# Patient Record
Sex: FEMALE | Race: OTHER | HISPANIC OR LATINO | URBAN - METROPOLITAN AREA
[De-identification: names, ages, dates, MRNs, and addresses within clinical notes are randomized per-mention and may not be internally consistent; named-entity substitution may affect disease eponyms.]

---

## 2023-12-14 ENCOUNTER — INPATIENT (INPATIENT)
Facility: HOSPITAL | Age: 28
LOS: 1 days | Discharge: HOME | DRG: 545 | End: 2023-12-16
Attending: OBSTETRICS & GYNECOLOGY | Admitting: OBSTETRICS & GYNECOLOGY
Payer: MEDICAID

## 2023-12-14 VITALS — WEIGHT: 176.37 LBS

## 2023-12-14 DIAGNOSIS — Z78.9 OTHER SPECIFIED HEALTH STATUS: ICD-10-CM

## 2023-12-14 LAB
ABO RH CONFIRMATION: SIGNIFICANT CHANGE UP
ABO RH CONFIRMATION: SIGNIFICANT CHANGE UP
ALBUMIN SERPL ELPH-MCNC: 3 G/DL — LOW (ref 3.3–5)
ALBUMIN SERPL ELPH-MCNC: 3 G/DL — LOW (ref 3.3–5)
ALP SERPL-CCNC: 80 U/L — SIGNIFICANT CHANGE UP (ref 40–120)
ALP SERPL-CCNC: 80 U/L — SIGNIFICANT CHANGE UP (ref 40–120)
ALT FLD-CCNC: 24 U/L — SIGNIFICANT CHANGE UP (ref 12–78)
ALT FLD-CCNC: 24 U/L — SIGNIFICANT CHANGE UP (ref 12–78)
ANION GAP SERPL CALC-SCNC: 5 MMOL/L — SIGNIFICANT CHANGE UP (ref 5–17)
ANION GAP SERPL CALC-SCNC: 5 MMOL/L — SIGNIFICANT CHANGE UP (ref 5–17)
APPEARANCE UR: ABNORMAL
APPEARANCE UR: ABNORMAL
APTT BLD: 23.4 SEC — LOW (ref 24.5–35.6)
APTT BLD: 23.4 SEC — LOW (ref 24.5–35.6)
AST SERPL-CCNC: 11 U/L — LOW (ref 15–37)
AST SERPL-CCNC: 11 U/L — LOW (ref 15–37)
BACTERIA # UR AUTO: NEGATIVE /HPF — SIGNIFICANT CHANGE UP
BACTERIA # UR AUTO: NEGATIVE /HPF — SIGNIFICANT CHANGE UP
BASOPHILS # BLD AUTO: 0.1 K/UL — SIGNIFICANT CHANGE UP (ref 0–0.2)
BASOPHILS # BLD AUTO: 0.1 K/UL — SIGNIFICANT CHANGE UP (ref 0–0.2)
BASOPHILS NFR BLD AUTO: 0.5 % — SIGNIFICANT CHANGE UP (ref 0–2)
BASOPHILS NFR BLD AUTO: 0.5 % — SIGNIFICANT CHANGE UP (ref 0–2)
BILIRUB SERPL-MCNC: 0.6 MG/DL — SIGNIFICANT CHANGE UP (ref 0.2–1.2)
BILIRUB SERPL-MCNC: 0.6 MG/DL — SIGNIFICANT CHANGE UP (ref 0.2–1.2)
BILIRUB UR-MCNC: ABNORMAL
BILIRUB UR-MCNC: ABNORMAL
BLD GP AB SCN SERPL QL: SIGNIFICANT CHANGE UP
BLD GP AB SCN SERPL QL: SIGNIFICANT CHANGE UP
BUN SERPL-MCNC: 5 MG/DL — LOW (ref 7–23)
BUN SERPL-MCNC: 5 MG/DL — LOW (ref 7–23)
CALCIUM SERPL-MCNC: 8.6 MG/DL — SIGNIFICANT CHANGE UP (ref 8.5–10.1)
CALCIUM SERPL-MCNC: 8.6 MG/DL — SIGNIFICANT CHANGE UP (ref 8.5–10.1)
CAST: 6 /LPF — HIGH (ref 0–4)
CAST: 6 /LPF — HIGH (ref 0–4)
CHLORIDE SERPL-SCNC: 108 MMOL/L — SIGNIFICANT CHANGE UP (ref 96–108)
CHLORIDE SERPL-SCNC: 108 MMOL/L — SIGNIFICANT CHANGE UP (ref 96–108)
CO2 SERPL-SCNC: 25 MMOL/L — SIGNIFICANT CHANGE UP (ref 22–31)
CO2 SERPL-SCNC: 25 MMOL/L — SIGNIFICANT CHANGE UP (ref 22–31)
COD CRY URNS QL: PRESENT
COD CRY URNS QL: PRESENT
COLOR SPEC: SIGNIFICANT CHANGE UP
COLOR SPEC: SIGNIFICANT CHANGE UP
CREAT SERPL-MCNC: 0.56 MG/DL — SIGNIFICANT CHANGE UP (ref 0.5–1.3)
CREAT SERPL-MCNC: 0.56 MG/DL — SIGNIFICANT CHANGE UP (ref 0.5–1.3)
DIFF PNL FLD: NEGATIVE — SIGNIFICANT CHANGE UP
DIFF PNL FLD: NEGATIVE — SIGNIFICANT CHANGE UP
EGFR: 129 ML/MIN/1.73M2 — SIGNIFICANT CHANGE UP
EGFR: 129 ML/MIN/1.73M2 — SIGNIFICANT CHANGE UP
EOSINOPHIL # BLD AUTO: 0.03 K/UL — SIGNIFICANT CHANGE UP (ref 0–0.5)
EOSINOPHIL # BLD AUTO: 0.03 K/UL — SIGNIFICANT CHANGE UP (ref 0–0.5)
EOSINOPHIL NFR BLD AUTO: 0.1 % — SIGNIFICANT CHANGE UP (ref 0–6)
EOSINOPHIL NFR BLD AUTO: 0.1 % — SIGNIFICANT CHANGE UP (ref 0–6)
GLUCOSE SERPL-MCNC: 139 MG/DL — HIGH (ref 70–99)
GLUCOSE SERPL-MCNC: 139 MG/DL — HIGH (ref 70–99)
GLUCOSE UR QL: NEGATIVE MG/DL — SIGNIFICANT CHANGE UP
GLUCOSE UR QL: NEGATIVE MG/DL — SIGNIFICANT CHANGE UP
GRAN CASTS # UR COMP ASSIST: SIGNIFICANT CHANGE UP
GRAN CASTS # UR COMP ASSIST: SIGNIFICANT CHANGE UP
HCG SERPL-ACNC: HIGH MIU/ML
HCG SERPL-ACNC: HIGH MIU/ML
HCT VFR BLD CALC: 28.5 % — LOW (ref 34.5–45)
HCT VFR BLD CALC: 28.5 % — LOW (ref 34.5–45)
HGB BLD-MCNC: 9.7 G/DL — LOW (ref 11.5–15.5)
HGB BLD-MCNC: 9.7 G/DL — LOW (ref 11.5–15.5)
HYALINE CASTS # UR AUTO: SIGNIFICANT CHANGE UP
HYALINE CASTS # UR AUTO: SIGNIFICANT CHANGE UP
IMM GRANULOCYTES NFR BLD AUTO: 0.6 % — SIGNIFICANT CHANGE UP (ref 0–0.9)
IMM GRANULOCYTES NFR BLD AUTO: 0.6 % — SIGNIFICANT CHANGE UP (ref 0–0.9)
INR BLD: 1.09 RATIO — SIGNIFICANT CHANGE UP (ref 0.85–1.18)
INR BLD: 1.09 RATIO — SIGNIFICANT CHANGE UP (ref 0.85–1.18)
KETONES UR-MCNC: ABNORMAL MG/DL
KETONES UR-MCNC: ABNORMAL MG/DL
LEUKOCYTE ESTERASE UR-ACNC: ABNORMAL
LEUKOCYTE ESTERASE UR-ACNC: ABNORMAL
LYMPHOCYTES # BLD AUTO: 11.7 % — LOW (ref 13–44)
LYMPHOCYTES # BLD AUTO: 11.7 % — LOW (ref 13–44)
LYMPHOCYTES # BLD AUTO: 2.56 K/UL — SIGNIFICANT CHANGE UP (ref 1–3.3)
LYMPHOCYTES # BLD AUTO: 2.56 K/UL — SIGNIFICANT CHANGE UP (ref 1–3.3)
MCHC RBC-ENTMCNC: 30.4 PG — SIGNIFICANT CHANGE UP (ref 27–34)
MCHC RBC-ENTMCNC: 30.4 PG — SIGNIFICANT CHANGE UP (ref 27–34)
MCHC RBC-ENTMCNC: 34 GM/DL — SIGNIFICANT CHANGE UP (ref 32–36)
MCHC RBC-ENTMCNC: 34 GM/DL — SIGNIFICANT CHANGE UP (ref 32–36)
MCV RBC AUTO: 89.3 FL — SIGNIFICANT CHANGE UP (ref 80–100)
MCV RBC AUTO: 89.3 FL — SIGNIFICANT CHANGE UP (ref 80–100)
MONOCYTES # BLD AUTO: 1.32 K/UL — HIGH (ref 0–0.9)
MONOCYTES # BLD AUTO: 1.32 K/UL — HIGH (ref 0–0.9)
MONOCYTES NFR BLD AUTO: 6 % — SIGNIFICANT CHANGE UP (ref 2–14)
MONOCYTES NFR BLD AUTO: 6 % — SIGNIFICANT CHANGE UP (ref 2–14)
NEUTROPHILS # BLD AUTO: 17.72 K/UL — HIGH (ref 1.8–7.4)
NEUTROPHILS # BLD AUTO: 17.72 K/UL — HIGH (ref 1.8–7.4)
NEUTROPHILS NFR BLD AUTO: 81.1 % — HIGH (ref 43–77)
NEUTROPHILS NFR BLD AUTO: 81.1 % — HIGH (ref 43–77)
NITRITE UR-MCNC: NEGATIVE — SIGNIFICANT CHANGE UP
NITRITE UR-MCNC: NEGATIVE — SIGNIFICANT CHANGE UP
PH UR: 6 — SIGNIFICANT CHANGE UP (ref 5–8)
PH UR: 6 — SIGNIFICANT CHANGE UP (ref 5–8)
PLATELET # BLD AUTO: 475 K/UL — HIGH (ref 150–400)
PLATELET # BLD AUTO: 475 K/UL — HIGH (ref 150–400)
POTASSIUM SERPL-MCNC: 4.2 MMOL/L — SIGNIFICANT CHANGE UP (ref 3.5–5.3)
POTASSIUM SERPL-MCNC: 4.2 MMOL/L — SIGNIFICANT CHANGE UP (ref 3.5–5.3)
POTASSIUM SERPL-SCNC: 4.2 MMOL/L — SIGNIFICANT CHANGE UP (ref 3.5–5.3)
POTASSIUM SERPL-SCNC: 4.2 MMOL/L — SIGNIFICANT CHANGE UP (ref 3.5–5.3)
PROT SERPL-MCNC: 7.1 GM/DL — SIGNIFICANT CHANGE UP (ref 6–8.3)
PROT SERPL-MCNC: 7.1 GM/DL — SIGNIFICANT CHANGE UP (ref 6–8.3)
PROT UR-MCNC: 100 MG/DL
PROT UR-MCNC: 100 MG/DL
PROTHROM AB SERPL-ACNC: 12.3 SEC — SIGNIFICANT CHANGE UP (ref 9.5–13)
PROTHROM AB SERPL-ACNC: 12.3 SEC — SIGNIFICANT CHANGE UP (ref 9.5–13)
RBC # BLD: 3.19 M/UL — LOW (ref 3.8–5.2)
RBC # BLD: 3.19 M/UL — LOW (ref 3.8–5.2)
RBC # FLD: 12.7 % — SIGNIFICANT CHANGE UP (ref 10.3–14.5)
RBC # FLD: 12.7 % — SIGNIFICANT CHANGE UP (ref 10.3–14.5)
RBC CASTS # UR COMP ASSIST: 6 /HPF — HIGH (ref 0–4)
RBC CASTS # UR COMP ASSIST: 6 /HPF — HIGH (ref 0–4)
SODIUM SERPL-SCNC: 138 MMOL/L — SIGNIFICANT CHANGE UP (ref 135–145)
SODIUM SERPL-SCNC: 138 MMOL/L — SIGNIFICANT CHANGE UP (ref 135–145)
SP GR SPEC: 1.02 — SIGNIFICANT CHANGE UP (ref 1–1.03)
SP GR SPEC: 1.02 — SIGNIFICANT CHANGE UP (ref 1–1.03)
SQUAMOUS # UR AUTO: 6 /HPF — HIGH (ref 0–5)
SQUAMOUS # UR AUTO: 6 /HPF — HIGH (ref 0–5)
UROBILINOGEN FLD QL: 1 MG/DL — SIGNIFICANT CHANGE UP (ref 0.2–1)
UROBILINOGEN FLD QL: 1 MG/DL — SIGNIFICANT CHANGE UP (ref 0.2–1)
WBC # BLD: 21.87 K/UL — HIGH (ref 3.8–10.5)
WBC # BLD: 21.87 K/UL — HIGH (ref 3.8–10.5)
WBC # FLD AUTO: 21.87 K/UL — HIGH (ref 3.8–10.5)
WBC # FLD AUTO: 21.87 K/UL — HIGH (ref 3.8–10.5)
WBC UR QL: 4 /HPF — SIGNIFICANT CHANGE UP (ref 0–5)
WBC UR QL: 4 /HPF — SIGNIFICANT CHANGE UP (ref 0–5)

## 2023-12-14 PROCEDURE — 76817 TRANSVAGINAL US OBSTETRIC: CPT | Mod: 26

## 2023-12-14 PROCEDURE — 85384 FIBRINOGEN ACTIVITY: CPT

## 2023-12-14 PROCEDURE — 36415 COLL VENOUS BLD VENIPUNCTURE: CPT

## 2023-12-14 PROCEDURE — P9016: CPT

## 2023-12-14 PROCEDURE — 80053 COMPREHEN METABOLIC PANEL: CPT

## 2023-12-14 PROCEDURE — 36430 TRANSFUSION BLD/BLD COMPNT: CPT

## 2023-12-14 PROCEDURE — C9399: CPT

## 2023-12-14 PROCEDURE — P9040: CPT

## 2023-12-14 PROCEDURE — 93005 ELECTROCARDIOGRAM TRACING: CPT

## 2023-12-14 PROCEDURE — 85027 COMPLETE CBC AUTOMATED: CPT

## 2023-12-14 PROCEDURE — 87040 BLOOD CULTURE FOR BACTERIA: CPT

## 2023-12-14 PROCEDURE — 83605 ASSAY OF LACTIC ACID: CPT

## 2023-12-14 PROCEDURE — 86920 COMPATIBILITY TEST SPIN: CPT

## 2023-12-14 PROCEDURE — 88305 TISSUE EXAM BY PATHOLOGIST: CPT | Mod: 26

## 2023-12-14 PROCEDURE — 85730 THROMBOPLASTIN TIME PARTIAL: CPT

## 2023-12-14 PROCEDURE — 86901 BLOOD TYPING SEROLOGIC RH(D): CPT

## 2023-12-14 PROCEDURE — 99291 CRITICAL CARE FIRST HOUR: CPT

## 2023-12-14 PROCEDURE — 85025 COMPLETE CBC W/AUTO DIFF WBC: CPT

## 2023-12-14 PROCEDURE — 86850 RBC ANTIBODY SCREEN: CPT

## 2023-12-14 PROCEDURE — 85610 PROTHROMBIN TIME: CPT

## 2023-12-14 PROCEDURE — 88305 TISSUE EXAM BY PATHOLOGIST: CPT

## 2023-12-14 PROCEDURE — 86900 BLOOD TYPING SEROLOGIC ABO: CPT

## 2023-12-14 RX ORDER — ACETAMINOPHEN 500 MG
1000 TABLET ORAL ONCE
Refills: 0 | Status: COMPLETED | OUTPATIENT
Start: 2023-12-14 | End: 2023-12-14

## 2023-12-14 RX ORDER — SODIUM CHLORIDE 9 MG/ML
2000 INJECTION INTRAMUSCULAR; INTRAVENOUS; SUBCUTANEOUS ONCE
Refills: 0 | Status: COMPLETED | OUTPATIENT
Start: 2023-12-14 | End: 2023-12-14

## 2023-12-14 RX ADMIN — Medication 400 MILLIGRAM(S): at 22:08

## 2023-12-14 RX ADMIN — SODIUM CHLORIDE 2000 MILLILITER(S): 9 INJECTION INTRAMUSCULAR; INTRAVENOUS; SUBCUTANEOUS at 21:02

## 2023-12-14 NOTE — H&P ADULT - ATTENDING COMMENTS
27 yo P0 with right adnexal ectopic, 10 weeks, + FHR, high risk for rupture, h/o syncope, patient with pale appearance, mild tachycardia, hypotensive, moderate anemia, will repeat  H/H now, 2 units on hold, will transfuse as needed, findings explained to patient, patient booked for diagnostic laparoscopy, possible salpingoophorectomy, possible laparotomy, R/B/A of procedure explained to patient in great detail, patient verbalized understanding and agreed to procedure. 29 yo P0 with right adnexal ectopic, 10 weeks, + FHR, high risk for rupture, h/o syncope, patient with pale appearance, mild tachycardia, hypotensive, moderate anemia, will repeat  H/H now, 2 units on hold, will transfuse as needed, findings explained to patient, patient booked for diagnostic laparoscopy, possible salpingoophorectomy, possible laparotomy, R/B/A of procedure explained to patient in great detail, patient verbalized understanding and agreed to procedure. 29 yo P0 with right adnexal ectopic, 10 weeks, + FHR, high risk for rupture, h/o syncope, patient with pale appearance, mild tachycardia, hypotensive, moderate anemia, vitals worsening, patient to get 2 units of PRBC transfused,  findings explained to patient, patient booked for diagnostic laparoscopy, possible salpingoophorectomy, possible laparotomy, R/B/A of procedure explained to patient in great detail, patient verbalized understanding and agreed to procedure.

## 2023-12-14 NOTE — ED ADULT NURSE NOTE - NSFALLRISKINTERV_ED_ALL_ED
Communicate fall risk and risk factors to all staff, patient, and family/Provide visual cue: yellow wristband, yellow gown, etc/Reinforce activity limits and safety measures with patient and family/Call bell, personal items and telephone in reach/Instruct patient to call for assistance before getting out of bed/chair/stretcher/Non-slip footwear applied when patient is off stretcher/[place] to call system/Physically safe environment - no spills, clutter or unnecessary equipment/Purposeful Proactive Rounding/Room/bathroom lighting operational, light cord in reach Communicate fall risk and risk factors to all staff, patient, and family/Provide visual cue: yellow wristband, yellow gown, etc/Reinforce activity limits and safety measures with patient and family/Call bell, personal items and telephone in reach/Instruct patient to call for assistance before getting out of bed/chair/stretcher/Non-slip footwear applied when patient is off stretcher/[place] to call system/Physically safe environment - no spills, clutter or unnecessary equipment/Purposeful Proactive Rounding/Room/bathroom lighting operational, light cord in reach

## 2023-12-14 NOTE — CONSULT NOTE ADULT - ASSESSMENT
28y , LMP 10/8/2023, presents with generalized abdominal pain for two days. Also reports heavy vaginal bleeding last week.    A/P:  -On presentation patient hypotensive to 70s/40 and tachycardic to 110s. BP and HR now improved after IVF. Afebrile.  -FAST exam negative. Bedside sono shows IUP with +HR.  -TVUS ordered for further evaluation.  -CMT noted with white vaginal discharge. Possible PID vs intrauterine infection vs septic ab.  -Pending GCCT and Bacterial vaginosis cultures.  -CBC shows Hgb 9 and elevated WBC of 21.  -Pending blood cultures.   -Pending UA.  -Will consider ABX.  -Continue with IVF hydration.

## 2023-12-14 NOTE — ED ADULT NURSE NOTE - CHIEF COMPLAINT QUOTE
brought in by wheelchair, complains of severe abdominal pain x 2 hours. hypotensive. patient had miscarriage 1 week ago. reportts vaginal bleeding two days ago. patient pale and lightheaded.

## 2023-12-14 NOTE — ED ADULT TRIAGE NOTE - WEIGHT METHOD
stated Additional Notes: Doxycycline 20 mg BID In reserve Detail Level: Simple Render Risk Assessment In Note?: no

## 2023-12-14 NOTE — ED PROVIDER NOTE - CHIEF COMPLAINT
Patient tolerated the procedure very well under propofol sedation.
The patient is a 28y Female complaining of abdominal pain.

## 2023-12-14 NOTE — ED PROVIDER NOTE - PROGRESS NOTE DETAILS
Cuong Parsons: Pt immediately evaluated. US contacted will come to bedside r/o ectopic. Spoke to OB immediately recommends 2 units of PRBCs.

## 2023-12-14 NOTE — H&P ADULT - NSHPLABSRESULTS_GEN_ALL_CORE
LABS:                        9.7    21.87 )-----------( 475      ( 14 Dec 2023 20:40 )             28.5

## 2023-12-14 NOTE — H&P ADULT - NSHPPHYSICALEXAM_GEN_ALL_CORE
Vital Signs Last 24 Hrs  T(C): 36.8 (14 Dec 2023 20:25), Max: 36.8 (14 Dec 2023 20:25)  T(F): 98.2 (14 Dec 2023 20:25), Max: 98.2 (14 Dec 2023 20:25)  HR: 114 (14 Dec 2023 20:25) (114 - 114)  BP: 76/47 (14 Dec 2023 20:25) (76/47 - 76/47)  BP(mean): 56 (14 Dec 2023 20:25) (56 - 56)  RR: 20 (14 Dec 2023 20:25) (20 - 20)  SpO2: 100% (14 Dec 2023 20:25) (100% - 100%)    Parameters below as of 14 Dec 2023 20:25  Patient On (Oxygen Delivery Method): room air         PHYSICAL EXAM:  General: Pale appearing  ABDOMEN: Tender to palpation of all quadrants, greater in bilateral lower quadrants and suprapubic area. soft, Nondistended  PELVIC:        EXTERNAL GENITALIA: Normal. No rashes or lesions noted.         VAGINA: minimal white vaginal discharge no blood noted.          CERVIX: + CMT, no lesions. closed, no active bleeding through os        Unable to perform bimanual exam due to patient discomfort

## 2023-12-14 NOTE — ED ADULT NURSE NOTE - OBJECTIVE STATEMENT
Pt is a 27 y/o female who present to the ED with c/o abdominal pain x 3 days.Pt states the pain got worst 2 hours prior to arrival . hypotensive. patient had miscarriage 1 week ago. reports vaginal bleeding two days ago. patient pale and lightheaded.

## 2023-12-14 NOTE — H&P ADULT - ASSESSMENT
What Type Of Note Output Would You Prefer (Optional)?: Bullet Format How Severe Is Your Skin Lesion?: mild Has Your Skin Lesion Been Treated?: not been treated Is This A New Presentation, Or A Follow-Up?: Skin Lesion Additional History: Pt had an abscess on side of finger nail drained at the ER on 9/30/2023. Pt reports that lesion is still painful. She was not given any rx for the abscess. She believes the infection was from getting her nails done. 28y , LMP 10/8/2023, presents with generalized abdominal pain for two days. Also reports heavy vaginal bleeding last week.    A/P:  -Patient is now tachycardic and pale appearing. Stat CBC ordered. 2U RBC on hold.  -Radiologist called to report right adnexal ectopic pregnancy.  -Due to clinical presentation and vital signs discussed iw patient recommendation for urgent surgical management.  -Patient agrees with surgical management for ectopic pregnancy. Patient consented. Will start preparing for OR.    Discussed with Dr. Sanon, 28y , LMP 10/8/2023, presents with generalized abdominal pain for two days. Also reports heavy vaginal bleeding last week.    A/P:  -Patient is now tachycardic and pale appearing. Stat CBC ordered. 2U RBC on hold.  -Radiologist called to report right adnexal ectopic pregnancy, 10 weeks, + FHR.  -Due to clinical presentation and vital signs discussed with patient recommendation for urgent surgical management.  -Patient agrees with surgical management for ectopic pregnancy. Patient consented. Will start preparing for OR.    Discussed with Dr. Sanon,

## 2023-12-14 NOTE — H&P ADULT - HISTORY OF PRESENT ILLNESS
28y , LMP 10/8/2023, presents with generalized abdominal pain for two days. Patient reports she believes she is pregnant but she thought she lost the pregnancy due to vaginal bleeding. LMP was 10/7/2023, but the patient reports heavy vaginal bleeding last week lasting three days. She has not established prenatal care or taken a pregnancy test at home. Patient also reports chills and weakness. Patient reports pink/white vaginal discharge for several days. Denies fevers, SOB, CP.    PMHX; denies  PSHX; denies  POBHX; denies hx of prior pregnancies  PGYNHX: denies hx of STIs, unsure if she has a history of ovarian cysts or fibroids  Allergies: No Known Allergies  MEDS: Denies

## 2023-12-14 NOTE — CONSULT NOTE ADULT - ATTENDING COMMENTS
29 yo P0 s/p syncope, presents with abdominal pain and h/o vaginal bleeding x 3 days, ultrasound done at bedside by ED attending showing aprox 10 weeks pregnancy with + FHR, no FF, urgent transvaginal US pending. Patient hypotensive, with mild tachycardia, moderate anemia noted, 2 units on hold.     addendum:   official TVUS showing right ectopic adnexal pregnancy, no FF, patient with pale appearance, mild tachycardic and hypotensive high risk for ruptured ectopic, to be booked urgently to the OR for diagnostic laparoscopy, possible salpingoophorectomy, possible laparotomy.

## 2023-12-14 NOTE — H&P ADULT - NSHPSOURCEINFORD_GEN_ALL_CORE
Patient transported to echo lab on bed with cardiac mopnitor, accompanied by 1 RN and patient escort staff.   Patient

## 2023-12-14 NOTE — ED PROVIDER NOTE - OBJECTIVE STATEMENT
29 y/o  female with no pertinent PMHx BIB wheelchair with boyfriend c/o severe abdominal pain. Pt reports last menstrual period was about 2 months ago, 10/06. Since last week Thursday pt with constant bilateral abdominal pain and since last week Friday pt with heavy vaginal bleeding, estimates changing her pad every hour. Today after showering, pt syncopized and boyfriend caught her. Since 2 hours PTA pt with acute worsening of abdominal pain so boyfriend brought pt to the ED. At the ED, pt very pale-appearing and hypotensive.    ID#: [id number] 29 y/o  female with no pertinent PMHx BIB wheelchair with boyfriend c/o severe abdominal pain. Pt reports last menstrual period was about 2 months ago, 10/06. Since last week Thursday pt with constant bilateral abdominal pain and since last week Friday pt with heavy vaginal bleeding, estimates changing her pad every hour. Today after showering, pt syncopized and boyfriend caught her. Since 2 hours PTA pt with acute worsening of abdominal pain so boyfriend brought pt to the ED. At the ED, pt very pale-appearing and hypotensive.    ID#: [id number]

## 2023-12-14 NOTE — CONSULT NOTE ADULT - SUBJECTIVE AND OBJECTIVE BOX
HPI:     28y , LMP 10/8/2023, presents with generalized abdominal pain for two days. Patient reports she believes she is pregnant but she thought she lost the pregnancy due to vaginal bleeding. LMP was 10/7/2023, but the patient reports heavy vaginal bleeding last week lasting three days. She has not established prenatal care or taken a pregnancy test at home. Patient also reports chills and weakness. Patient reports pink/white vaginal discharge for several days. Denies fevers, SOB, CP.    PMHX; denies  PSHX; denies  POBHX; denies hx of prior pregnancies  PGYNHX: denies hx of STIs, unsure if she has a history of ovarian cysts or fibroids  Allergies: No Known Allergies  MEDS: Denies      Vital Signs Last 24 Hrs  T(C): 36.8 (14 Dec 2023 20:25), Max: 36.8 (14 Dec 2023 20:25)  T(F): 98.2 (14 Dec 2023 20:25), Max: 98.2 (14 Dec 2023 20:25)  HR: 114 (14 Dec 2023 20:25) (114 - 114)  BP: 76/47 (14 Dec 2023 20:25) (76/47 - 76/47)  BP(mean): 56 (14 Dec 2023 20:25) (56 - 56)  RR: 20 (14 Dec 2023 20:25) (20 - 20)  SpO2: 100% (14 Dec 2023 20:25) (100% - 100%)    Parameters below as of 14 Dec 2023 20:25  Patient On (Oxygen Delivery Method): room air         PHYSICAL EXAM:  General: Pale appearing  ABDOMEN: Tender to palpation of all quadrants, greater in bilateral lower quadrants and suprapubic area. soft, Nondistended  PELVIC:        EXTERNAL GENITALIA: Normal. No rashes or lesions noted.         VAGINA: minimal white vaginal discharge no blood noted.          CERVIX: + CMT, no lesions. closed, no active bleeding through os        Unable to perform bimanual exam due to patient discomfort    LABS:                        9.7    21.87 )-----------( 475      ( 14 Dec 2023 20:40 )             28.5                 RADIOLOGY STUDIES:

## 2023-12-15 LAB
ALBUMIN SERPL ELPH-MCNC: 2.1 G/DL — LOW (ref 3.3–5)
ALP SERPL-CCNC: 54 U/L — SIGNIFICANT CHANGE UP (ref 40–120)
ALP SERPL-CCNC: 54 U/L — SIGNIFICANT CHANGE UP (ref 40–120)
ALP SERPL-CCNC: 59 U/L — SIGNIFICANT CHANGE UP (ref 40–120)
ALP SERPL-CCNC: 59 U/L — SIGNIFICANT CHANGE UP (ref 40–120)
ALT FLD-CCNC: 15 U/L — SIGNIFICANT CHANGE UP (ref 12–78)
ANION GAP SERPL CALC-SCNC: 7 MMOL/L — SIGNIFICANT CHANGE UP (ref 5–17)
ANION GAP SERPL CALC-SCNC: 7 MMOL/L — SIGNIFICANT CHANGE UP (ref 5–17)
ANION GAP SERPL CALC-SCNC: 8 MMOL/L — SIGNIFICANT CHANGE UP (ref 5–17)
ANION GAP SERPL CALC-SCNC: 8 MMOL/L — SIGNIFICANT CHANGE UP (ref 5–17)
APTT BLD: 27.7 SEC — SIGNIFICANT CHANGE UP (ref 24.5–35.6)
APTT BLD: 27.7 SEC — SIGNIFICANT CHANGE UP (ref 24.5–35.6)
AST SERPL-CCNC: 12 U/L — LOW (ref 15–37)
AST SERPL-CCNC: 12 U/L — LOW (ref 15–37)
AST SERPL-CCNC: 13 U/L — LOW (ref 15–37)
AST SERPL-CCNC: 13 U/L — LOW (ref 15–37)
BASOPHILS # BLD AUTO: 0 K/UL — SIGNIFICANT CHANGE UP (ref 0–0.2)
BASOPHILS # BLD AUTO: 0 K/UL — SIGNIFICANT CHANGE UP (ref 0–0.2)
BASOPHILS # BLD AUTO: 0.02 K/UL — SIGNIFICANT CHANGE UP (ref 0–0.2)
BASOPHILS # BLD AUTO: 0.02 K/UL — SIGNIFICANT CHANGE UP (ref 0–0.2)
BASOPHILS NFR BLD AUTO: 0 % — SIGNIFICANT CHANGE UP (ref 0–2)
BASOPHILS NFR BLD AUTO: 0 % — SIGNIFICANT CHANGE UP (ref 0–2)
BASOPHILS NFR BLD AUTO: 0.1 % — SIGNIFICANT CHANGE UP (ref 0–2)
BASOPHILS NFR BLD AUTO: 0.1 % — SIGNIFICANT CHANGE UP (ref 0–2)
BILIRUB SERPL-MCNC: 0.4 MG/DL — SIGNIFICANT CHANGE UP (ref 0.2–1.2)
BILIRUB SERPL-MCNC: 0.4 MG/DL — SIGNIFICANT CHANGE UP (ref 0.2–1.2)
BILIRUB SERPL-MCNC: 0.9 MG/DL — SIGNIFICANT CHANGE UP (ref 0.2–1.2)
BILIRUB SERPL-MCNC: 0.9 MG/DL — SIGNIFICANT CHANGE UP (ref 0.2–1.2)
BUN SERPL-MCNC: 4 MG/DL — LOW (ref 7–23)
CALCIUM SERPL-MCNC: 6.8 MG/DL — LOW (ref 8.5–10.1)
CALCIUM SERPL-MCNC: 6.8 MG/DL — LOW (ref 8.5–10.1)
CALCIUM SERPL-MCNC: 7.3 MG/DL — LOW (ref 8.5–10.1)
CALCIUM SERPL-MCNC: 7.3 MG/DL — LOW (ref 8.5–10.1)
CHLORIDE SERPL-SCNC: 111 MMOL/L — HIGH (ref 96–108)
CHLORIDE SERPL-SCNC: 111 MMOL/L — HIGH (ref 96–108)
CHLORIDE SERPL-SCNC: 112 MMOL/L — HIGH (ref 96–108)
CHLORIDE SERPL-SCNC: 112 MMOL/L — HIGH (ref 96–108)
CO2 SERPL-SCNC: 18 MMOL/L — LOW (ref 22–31)
CO2 SERPL-SCNC: 18 MMOL/L — LOW (ref 22–31)
CO2 SERPL-SCNC: 21 MMOL/L — LOW (ref 22–31)
CO2 SERPL-SCNC: 21 MMOL/L — LOW (ref 22–31)
CREAT SERPL-MCNC: 0.37 MG/DL — LOW (ref 0.5–1.3)
CREAT SERPL-MCNC: 0.37 MG/DL — LOW (ref 0.5–1.3)
CREAT SERPL-MCNC: 0.43 MG/DL — LOW (ref 0.5–1.3)
CREAT SERPL-MCNC: 0.43 MG/DL — LOW (ref 0.5–1.3)
EGFR: 137 ML/MIN/1.73M2 — SIGNIFICANT CHANGE UP
EGFR: 137 ML/MIN/1.73M2 — SIGNIFICANT CHANGE UP
EGFR: 143 ML/MIN/1.73M2 — SIGNIFICANT CHANGE UP
EGFR: 143 ML/MIN/1.73M2 — SIGNIFICANT CHANGE UP
EOSINOPHIL # BLD AUTO: 0 K/UL — SIGNIFICANT CHANGE UP (ref 0–0.5)
EOSINOPHIL NFR BLD AUTO: 0 % — SIGNIFICANT CHANGE UP (ref 0–6)
FIBRINOGEN PPP-MCNC: 345 MG/DL — SIGNIFICANT CHANGE UP (ref 200–435)
FIBRINOGEN PPP-MCNC: 345 MG/DL — SIGNIFICANT CHANGE UP (ref 200–435)
GLUCOSE SERPL-MCNC: 121 MG/DL — HIGH (ref 70–99)
GLUCOSE SERPL-MCNC: 121 MG/DL — HIGH (ref 70–99)
GLUCOSE SERPL-MCNC: 171 MG/DL — HIGH (ref 70–99)
GLUCOSE SERPL-MCNC: 171 MG/DL — HIGH (ref 70–99)
HCT VFR BLD CALC: 20.5 % — CRITICAL LOW (ref 34.5–45)
HCT VFR BLD CALC: 20.5 % — CRITICAL LOW (ref 34.5–45)
HCT VFR BLD CALC: 21.4 % — LOW (ref 34.5–45)
HCT VFR BLD CALC: 21.4 % — LOW (ref 34.5–45)
HCT VFR BLD CALC: 26.8 % — LOW (ref 34.5–45)
HCT VFR BLD CALC: 26.8 % — LOW (ref 34.5–45)
HGB BLD-MCNC: 7.1 G/DL — LOW (ref 11.5–15.5)
HGB BLD-MCNC: 7.1 G/DL — LOW (ref 11.5–15.5)
HGB BLD-MCNC: 7.2 G/DL — LOW (ref 11.5–15.5)
HGB BLD-MCNC: 7.2 G/DL — LOW (ref 11.5–15.5)
HGB BLD-MCNC: 8.6 G/DL — LOW (ref 11.5–15.5)
HGB BLD-MCNC: 8.6 G/DL — LOW (ref 11.5–15.5)
IMM GRANULOCYTES NFR BLD AUTO: 0.4 % — SIGNIFICANT CHANGE UP (ref 0–0.9)
IMM GRANULOCYTES NFR BLD AUTO: 0.4 % — SIGNIFICANT CHANGE UP (ref 0–0.9)
INR BLD: 1.14 RATIO — SIGNIFICANT CHANGE UP (ref 0.85–1.18)
INR BLD: 1.14 RATIO — SIGNIFICANT CHANGE UP (ref 0.85–1.18)
LACTATE SERPL-SCNC: 1.7 MMOL/L — SIGNIFICANT CHANGE UP (ref 0.7–2)
LACTATE SERPL-SCNC: 1.7 MMOL/L — SIGNIFICANT CHANGE UP (ref 0.7–2)
LYMPHOCYTES # BLD AUTO: 0.69 K/UL — LOW (ref 1–3.3)
LYMPHOCYTES # BLD AUTO: 0.69 K/UL — LOW (ref 1–3.3)
LYMPHOCYTES # BLD AUTO: 1.29 K/UL — SIGNIFICANT CHANGE UP (ref 1–3.3)
LYMPHOCYTES # BLD AUTO: 1.29 K/UL — SIGNIFICANT CHANGE UP (ref 1–3.3)
LYMPHOCYTES # BLD AUTO: 3 % — LOW (ref 13–44)
LYMPHOCYTES # BLD AUTO: 3 % — LOW (ref 13–44)
LYMPHOCYTES # BLD AUTO: 8.1 % — LOW (ref 13–44)
LYMPHOCYTES # BLD AUTO: 8.1 % — LOW (ref 13–44)
MCHC RBC-ENTMCNC: 29.5 PG — SIGNIFICANT CHANGE UP (ref 27–34)
MCHC RBC-ENTMCNC: 29.5 PG — SIGNIFICANT CHANGE UP (ref 27–34)
MCHC RBC-ENTMCNC: 29.6 PG — SIGNIFICANT CHANGE UP (ref 27–34)
MCHC RBC-ENTMCNC: 29.6 PG — SIGNIFICANT CHANGE UP (ref 27–34)
MCHC RBC-ENTMCNC: 30.1 PG — SIGNIFICANT CHANGE UP (ref 27–34)
MCHC RBC-ENTMCNC: 30.1 PG — SIGNIFICANT CHANGE UP (ref 27–34)
MCHC RBC-ENTMCNC: 32.1 GM/DL — SIGNIFICANT CHANGE UP (ref 32–36)
MCHC RBC-ENTMCNC: 32.1 GM/DL — SIGNIFICANT CHANGE UP (ref 32–36)
MCHC RBC-ENTMCNC: 33.6 GM/DL — SIGNIFICANT CHANGE UP (ref 32–36)
MCHC RBC-ENTMCNC: 33.6 GM/DL — SIGNIFICANT CHANGE UP (ref 32–36)
MCHC RBC-ENTMCNC: 34.6 GM/DL — SIGNIFICANT CHANGE UP (ref 32–36)
MCHC RBC-ENTMCNC: 34.6 GM/DL — SIGNIFICANT CHANGE UP (ref 32–36)
MCV RBC AUTO: 86.9 FL — SIGNIFICANT CHANGE UP (ref 80–100)
MCV RBC AUTO: 86.9 FL — SIGNIFICANT CHANGE UP (ref 80–100)
MCV RBC AUTO: 88.1 FL — SIGNIFICANT CHANGE UP (ref 80–100)
MCV RBC AUTO: 88.1 FL — SIGNIFICANT CHANGE UP (ref 80–100)
MCV RBC AUTO: 91.8 FL — SIGNIFICANT CHANGE UP (ref 80–100)
MCV RBC AUTO: 91.8 FL — SIGNIFICANT CHANGE UP (ref 80–100)
MONOCYTES # BLD AUTO: 0.23 K/UL — SIGNIFICANT CHANGE UP (ref 0–0.9)
MONOCYTES # BLD AUTO: 0.23 K/UL — SIGNIFICANT CHANGE UP (ref 0–0.9)
MONOCYTES # BLD AUTO: 0.56 K/UL — SIGNIFICANT CHANGE UP (ref 0–0.9)
MONOCYTES # BLD AUTO: 0.56 K/UL — SIGNIFICANT CHANGE UP (ref 0–0.9)
MONOCYTES NFR BLD AUTO: 1 % — LOW (ref 2–14)
MONOCYTES NFR BLD AUTO: 1 % — LOW (ref 2–14)
MONOCYTES NFR BLD AUTO: 3.5 % — SIGNIFICANT CHANGE UP (ref 2–14)
MONOCYTES NFR BLD AUTO: 3.5 % — SIGNIFICANT CHANGE UP (ref 2–14)
NEUTROPHILS # BLD AUTO: 13.97 K/UL — HIGH (ref 1.8–7.4)
NEUTROPHILS # BLD AUTO: 13.97 K/UL — HIGH (ref 1.8–7.4)
NEUTROPHILS # BLD AUTO: 22.14 K/UL — HIGH (ref 1.8–7.4)
NEUTROPHILS # BLD AUTO: 22.14 K/UL — HIGH (ref 1.8–7.4)
NEUTROPHILS NFR BLD AUTO: 87.9 % — HIGH (ref 43–77)
NEUTROPHILS NFR BLD AUTO: 87.9 % — HIGH (ref 43–77)
NEUTROPHILS NFR BLD AUTO: 96 % — HIGH (ref 43–77)
NEUTROPHILS NFR BLD AUTO: 96 % — HIGH (ref 43–77)
NRBC # BLD: SIGNIFICANT CHANGE UP /100 WBCS (ref 0–0)
NRBC # BLD: SIGNIFICANT CHANGE UP /100 WBCS (ref 0–0)
PLATELET # BLD AUTO: 310 K/UL — SIGNIFICANT CHANGE UP (ref 150–400)
PLATELET # BLD AUTO: 310 K/UL — SIGNIFICANT CHANGE UP (ref 150–400)
PLATELET # BLD AUTO: 316 K/UL — SIGNIFICANT CHANGE UP (ref 150–400)
PLATELET # BLD AUTO: 316 K/UL — SIGNIFICANT CHANGE UP (ref 150–400)
PLATELET # BLD AUTO: 325 K/UL — SIGNIFICANT CHANGE UP (ref 150–400)
PLATELET # BLD AUTO: 325 K/UL — SIGNIFICANT CHANGE UP (ref 150–400)
POTASSIUM SERPL-MCNC: 4.1 MMOL/L — SIGNIFICANT CHANGE UP (ref 3.5–5.3)
POTASSIUM SERPL-MCNC: 4.1 MMOL/L — SIGNIFICANT CHANGE UP (ref 3.5–5.3)
POTASSIUM SERPL-MCNC: 4.5 MMOL/L — SIGNIFICANT CHANGE UP (ref 3.5–5.3)
POTASSIUM SERPL-MCNC: 4.5 MMOL/L — SIGNIFICANT CHANGE UP (ref 3.5–5.3)
POTASSIUM SERPL-SCNC: 4.1 MMOL/L — SIGNIFICANT CHANGE UP (ref 3.5–5.3)
POTASSIUM SERPL-SCNC: 4.1 MMOL/L — SIGNIFICANT CHANGE UP (ref 3.5–5.3)
POTASSIUM SERPL-SCNC: 4.5 MMOL/L — SIGNIFICANT CHANGE UP (ref 3.5–5.3)
POTASSIUM SERPL-SCNC: 4.5 MMOL/L — SIGNIFICANT CHANGE UP (ref 3.5–5.3)
PROT SERPL-MCNC: 5 GM/DL — LOW (ref 6–8.3)
PROT SERPL-MCNC: 5 GM/DL — LOW (ref 6–8.3)
PROT SERPL-MCNC: 5.1 GM/DL — LOW (ref 6–8.3)
PROT SERPL-MCNC: 5.1 GM/DL — LOW (ref 6–8.3)
PROTHROM AB SERPL-ACNC: 12.8 SEC — SIGNIFICANT CHANGE UP (ref 9.5–13)
PROTHROM AB SERPL-ACNC: 12.8 SEC — SIGNIFICANT CHANGE UP (ref 9.5–13)
RBC # BLD: 2.36 M/UL — LOW (ref 3.8–5.2)
RBC # BLD: 2.36 M/UL — LOW (ref 3.8–5.2)
RBC # BLD: 2.43 M/UL — LOW (ref 3.8–5.2)
RBC # BLD: 2.43 M/UL — LOW (ref 3.8–5.2)
RBC # BLD: 2.92 M/UL — LOW (ref 3.8–5.2)
RBC # BLD: 2.92 M/UL — LOW (ref 3.8–5.2)
RBC # FLD: 14.1 % — SIGNIFICANT CHANGE UP (ref 10.3–14.5)
RBC # FLD: 14.1 % — SIGNIFICANT CHANGE UP (ref 10.3–14.5)
RBC # FLD: 14.2 % — SIGNIFICANT CHANGE UP (ref 10.3–14.5)
RBC # FLD: 14.2 % — SIGNIFICANT CHANGE UP (ref 10.3–14.5)
RBC # FLD: 14.3 % — SIGNIFICANT CHANGE UP (ref 10.3–14.5)
RBC # FLD: 14.3 % — SIGNIFICANT CHANGE UP (ref 10.3–14.5)
SODIUM SERPL-SCNC: 138 MMOL/L — SIGNIFICANT CHANGE UP (ref 135–145)
SODIUM SERPL-SCNC: 138 MMOL/L — SIGNIFICANT CHANGE UP (ref 135–145)
SODIUM SERPL-SCNC: 139 MMOL/L — SIGNIFICANT CHANGE UP (ref 135–145)
SODIUM SERPL-SCNC: 139 MMOL/L — SIGNIFICANT CHANGE UP (ref 135–145)
WBC # BLD: 15.91 K/UL — HIGH (ref 3.8–10.5)
WBC # BLD: 15.91 K/UL — HIGH (ref 3.8–10.5)
WBC # BLD: 16.26 K/UL — HIGH (ref 3.8–10.5)
WBC # BLD: 16.26 K/UL — HIGH (ref 3.8–10.5)
WBC # BLD: 23.06 K/UL — HIGH (ref 3.8–10.5)
WBC # BLD: 23.06 K/UL — HIGH (ref 3.8–10.5)
WBC # FLD AUTO: 15.91 K/UL — HIGH (ref 3.8–10.5)
WBC # FLD AUTO: 15.91 K/UL — HIGH (ref 3.8–10.5)
WBC # FLD AUTO: 16.26 K/UL — HIGH (ref 3.8–10.5)
WBC # FLD AUTO: 16.26 K/UL — HIGH (ref 3.8–10.5)
WBC # FLD AUTO: 23.06 K/UL — HIGH (ref 3.8–10.5)
WBC # FLD AUTO: 23.06 K/UL — HIGH (ref 3.8–10.5)

## 2023-12-15 PROCEDURE — 59151 TREAT ECTOPIC PREGNANCY: CPT

## 2023-12-15 PROCEDURE — 93010 ELECTROCARDIOGRAM REPORT: CPT

## 2023-12-15 RX ORDER — OXYCODONE HYDROCHLORIDE 5 MG/1
5 TABLET ORAL ONCE
Refills: 0 | Status: DISCONTINUED | OUTPATIENT
Start: 2023-12-15 | End: 2023-12-15

## 2023-12-15 RX ORDER — SODIUM CHLORIDE 9 MG/ML
1000 INJECTION, SOLUTION INTRAVENOUS
Refills: 0 | Status: DISCONTINUED | OUTPATIENT
Start: 2023-12-15 | End: 2023-12-15

## 2023-12-15 RX ORDER — SODIUM CHLORIDE 9 MG/ML
1000 INJECTION, SOLUTION INTRAVENOUS
Refills: 0 | Status: DISCONTINUED | OUTPATIENT
Start: 2023-12-15 | End: 2023-12-16

## 2023-12-15 RX ORDER — ACETAMINOPHEN 500 MG
3 TABLET ORAL
Qty: 84 | Refills: 0
Start: 2023-12-15 | End: 2023-12-21

## 2023-12-15 RX ORDER — IBUPROFEN 200 MG
1 TABLET ORAL
Qty: 28 | Refills: 0
Start: 2023-12-15 | End: 2023-12-21

## 2023-12-15 RX ORDER — FENTANYL CITRATE 50 UG/ML
50 INJECTION INTRAVENOUS
Refills: 0 | Status: DISCONTINUED | OUTPATIENT
Start: 2023-12-15 | End: 2023-12-15

## 2023-12-15 RX ORDER — ACETAMINOPHEN 500 MG
1000 TABLET ORAL ONCE
Refills: 0 | Status: COMPLETED | OUTPATIENT
Start: 2023-12-15 | End: 2023-12-15

## 2023-12-15 RX ORDER — ONDANSETRON 8 MG/1
4 TABLET, FILM COATED ORAL ONCE
Refills: 0 | Status: DISCONTINUED | OUTPATIENT
Start: 2023-12-15 | End: 2023-12-15

## 2023-12-15 RX ORDER — SIMETHICONE 80 MG/1
80 TABLET, CHEWABLE ORAL
Refills: 0 | Status: DISCONTINUED | OUTPATIENT
Start: 2023-12-15 | End: 2023-12-16

## 2023-12-15 RX ADMIN — Medication 400 MILLIGRAM(S): at 11:16

## 2023-12-15 RX ADMIN — Medication 1000 MILLIGRAM(S): at 11:31

## 2023-12-15 RX ADMIN — FENTANYL CITRATE 50 MICROGRAM(S): 50 INJECTION INTRAVENOUS at 01:33

## 2023-12-15 RX ADMIN — SIMETHICONE 80 MILLIGRAM(S): 80 TABLET, CHEWABLE ORAL at 11:18

## 2023-12-15 NOTE — BRIEF OPERATIVE NOTE - CO SURGEON
Dejan Oral Minoxidil Pregnancy And Lactation Text: This medication should only be used when clearly needed if you are pregnant, attempting to become pregnant or breast feeding.

## 2023-12-15 NOTE — PROGRESS NOTE ADULT - ATTENDING COMMENTS
patient doing well, resting comfortably in bed, vitals stable, will repeat H/H later this morning and transfused further if needed, incisions healing well. patient doing well, resting comfortably in bed, vitals stable, will repeat H/H later this morning and transfuse further if needed, incisions clean, healing well.

## 2023-12-15 NOTE — DISCHARGE NOTE PROVIDER - CARE PROVIDER_API CALL
Kassy Duarte  Obstetrics and Gynecology  [address] [place] [address]  Phone: [phone]  Fax: [phone]  Follow Up Time:    Kassy Duarte  Obstetrics and Gynecology  [address] [place] [address]  Phone: [phone]  Fax: [phone]  Follow Up Time:

## 2023-12-15 NOTE — BRIEF OPERATIVE NOTE - NSICDXBRIEFPOSTOP_GEN_ALL_CORE_FT
POST-OP DIAGNOSIS:  Ruptured right tubal ectopic pregnancy causing hemoperitoneum 15-Dec-2023 01:24:24  Liberty Fatima

## 2023-12-15 NOTE — BRIEF OPERATIVE NOTE - OPERATION/FINDINGS
Approximately 1L of hemoperitoneum. Ruptured right fallopian tube ectopic pregnancy. Left fallopian tube noted to be enlarged. Adhesion noted from posterior uterus to bowel. Grossly normal bilateral ovaries.
Large right ruptured tubal ectopic pregnancy, 1000 mL of hemoperitoneum upon entry, filmy adhesions noted in posterior cul de sac, and between omentum and bowel to the posterior uterine wall, mild left hydrosalpinx noted, normal appearing bilateral ovaries

## 2023-12-15 NOTE — PATIENT PROFILE ADULT - FALL HARM RISK - HARM RISK INTERVENTIONS
Assistance with ambulation/Assistance OOB with selected safe patient handling equipment/Communicate Risk of Fall with Harm to all staff/Monitor gait and stability/Reinforce activity limits and safety measures with patient and family/Sit up slowly, dangle for a short time, stand at bedside before walking/Tailored Fall Risk Interventions/Use of alarms - bed, chair and/or voice tab/Visual Cue: Yellow wristband and red socks/Bed in lowest position, wheels locked, appropriate side rails in place/Call bell, personal items and telephone in reach/Instruct patient to call for assistance before getting out of bed or chair/Non-slip footwear when patient is out of bed/[place] to call system/Physically safe environment - no spills, clutter or unnecessary equipment/Purposeful Proactive Rounding/Room/bathroom lighting operational, light cord in reach Assistance with ambulation/Assistance OOB with selected safe patient handling equipment/Communicate Risk of Fall with Harm to all staff/Monitor gait and stability/Reinforce activity limits and safety measures with patient and family/Sit up slowly, dangle for a short time, stand at bedside before walking/Tailored Fall Risk Interventions/Use of alarms - bed, chair and/or voice tab/Visual Cue: Yellow wristband and red socks/Bed in lowest position, wheels locked, appropriate side rails in place/Call bell, personal items and telephone in reach/Instruct patient to call for assistance before getting out of bed or chair/Non-slip footwear when patient is out of bed/[place] to call system/Physically safe environment - no spills, clutter or unnecessary equipment/Purposeful Proactive Rounding/Room/bathroom lighting operational, light cord in reach

## 2023-12-15 NOTE — BRIEF OPERATIVE NOTE - COMMENTS
stat labs to be done in PACU, will transfuse as needed stat labs to be done in PACU, will transfuse as needed  Dictation #: [id number] stat labs to be done in PACU, will transfuse as needed  Dictation #: [id number]

## 2023-12-15 NOTE — PROVIDER CONTACT NOTE (CRITICAL VALUE NOTIFICATION) - PERSON GIVING RESULT:
Regular rate & rhythm, normal S1, S2; no murmurs, gallops or rubs; no S3, S4 Jaxon, LARISSA Garcia negative details… detailed exam

## 2023-12-15 NOTE — PHARMACOTHERAPY INTERVENTION NOTE - COMMENTS
Medication reconciliation complete, [facility] and [facility]. Patient is not on any prescription medications at home.  Medication reconciliation complete, [facility] Patient is not on any prescription medications at home.

## 2023-12-15 NOTE — DISCHARGE NOTE PROVIDER - NSDCCPTREATMENT_GEN_ALL_CORE_FT
PRINCIPAL PROCEDURE  Procedure: Laparoscopic right salpingectomy for ectopic pregnancy  Findings and Treatment:       SECONDARY PROCEDURE  Procedure: Evacuation of hemoperitoneum  Findings and Treatment:

## 2023-12-15 NOTE — DISCHARGE NOTE PROVIDER - NSDCFUADDINST_GEN_ALL_CORE_FT
1) Please take ibuprofen, tylenol and other prescribed medications as needed for pain.  2) Miralax, Senna can be purchased at the pharmacy to help with bowel regularity   3) Nothing in the vagina for 6 weeks (including no sex, no tampons, and no douching).  4) No tub baths or pools for 6 weeks. Showers are okay. Please keep your incision(s) dry until your follow up appointment.  5) Limit heavy lifting over 10lbs until your follow up appointment  6) Please call your doctor for a follow up appointment if you do not already have one  7) Please call the office sooner if you have heavy vaginal bleeding, severe abdominal pain, fever over 100.4F, or are unable to urinate

## 2023-12-15 NOTE — DISCHARGE NOTE PROVIDER - HOSPITAL COURSE
Patient admitted after lap right salpingectomy and evacuation of hemoperitoenum due to ruptured ectopic pregnancy. She was transferred to the floor in stable condition after uneventful PACU recovery. Her hospital stay was uncomplicated. Upon discharge she was ambulating, voiding, tolerating PO. Pain well controlled with prn pain meds.      Patient admitted after lap right salpingectomy and evacuation of hemoperitoenum due to ruptured ectopic pregnancy. She was transferred to the floor in stable condition after uneventful PACU recovery. Her hospital stay was complicated by acute blood loss anemia requiring 2u pRBCs intraoperatively and 1u pRBC post operatively. Upon discharge she was ambulating, voiding, tolerating PO, and labs were stable. Pain well controlled with prn pain meds.

## 2023-12-15 NOTE — BRIEF OPERATIVE NOTE - NSICDXBRIEFPROCEDURE_GEN_ALL_CORE_FT
PROCEDURES:  Right salpingectomy for ectopic pregnancy 15-Dec-2023 01:23:44  Liberty Fatima  Evacuation of hemoperitoneum 15-Dec-2023 01:23:51  Liberty Fatima

## 2023-12-15 NOTE — DISCHARGE NOTE PROVIDER - NSDCMRMEDTOKEN_GEN_ALL_CORE_FT
ibuprofen 600 mg oral tablet: 1 tab(s) orally every 6 hours  Tylenol 325 mg oral tablet: 3 tab(s) orally every 6 hours

## 2023-12-15 NOTE — CHART NOTE - NSCHARTNOTEFT_GEN_A_CORE
RN informed MD that patient was complaining of pain. MD evaluated patient at bedside. Patient reports pain that increased after she ate, 7/10 in intensity. Thinks it may be related to gas. Patient resting in bed in NAD. Upper mid abdominal tenderness on exam, no rebound, no guarding, no nausea or vomiting. Incisions, clean, dry, intact. Encouraged to ambulate with nurse and will order simethicone and IV tylenol.      Vital Signs Last 24 Hrs  T(C): 36.7 (15 Dec 2023 08:53), Max: 36.8 (14 Dec 2023 20:25)  T(F): 98.1 (15 Dec 2023 08:53), Max: 98.2 (14 Dec 2023 20:25)  HR: 82 (15 Dec 2023 08:53) (78 - 120)  BP: 103/63 (15 Dec 2023 08:53) (76/47 - 118/79)  BP(mean): 79 (14 Dec 2023 22:12) (56 - 79)  RR: 18 (15 Dec 2023 08:53) (16 - 25)  SpO2: 98% (15 Dec 2023 08:53) (93% - 100%)    Parameters below as of 15 Dec 2023 08:53  Patient On (Oxygen Delivery Method): room air RN informed MD that patient was complaining of pain. MD evaluated patient at bedside. Patient reports pain that increased after she ate, 7/10 in intensity. Thinks it may be related to gas. Patient resting in bed in NAD. Upper mid abdominal tenderness on exam, no rebound, no guarding, no nausea or vomiting. Incisions, clean, dry, intact. Encouraged to ambulate with nurse and will order simethicone and IV tylenol.      Vital Signs Last 24 Hrs  T(C): 36.7 (15 Dec 2023 08:53), Max: 36.8 (14 Dec 2023 20:25)  T(F): 98.1 (15 Dec 2023 08:53), Max: 98.2 (14 Dec 2023 20:25)  HR: 82 (15 Dec 2023 08:53) (78 - 120)  BP: 103/63 (15 Dec 2023 08:53) (76/47 - 118/79)  BP(mean): 79 (14 Dec 2023 22:12) (56 - 79)  RR: 18 (15 Dec 2023 08:53) (16 - 25)  SpO2: 98% (15 Dec 2023 08:53) (93% - 100%)    Parameters below as of 15 Dec 2023 08:53  Patient On (Oxygen Delivery Method): room air      Attending addendum:  I agree with the above assessment and plan.    Gloria Rodriguez MD  OBGYN Hospitalist

## 2023-12-16 VITALS
TEMPERATURE: 98 F | SYSTOLIC BLOOD PRESSURE: 103 MMHG | HEART RATE: 84 BPM | DIASTOLIC BLOOD PRESSURE: 52 MMHG | OXYGEN SATURATION: 99 % | RESPIRATION RATE: 18 BRPM

## 2023-12-16 LAB
APTT BLD: 29.9 SEC — SIGNIFICANT CHANGE UP (ref 24.5–35.6)
APTT BLD: 29.9 SEC — SIGNIFICANT CHANGE UP (ref 24.5–35.6)
BLD GP AB SCN SERPL QL: SIGNIFICANT CHANGE UP
BLD GP AB SCN SERPL QL: SIGNIFICANT CHANGE UP
CULTURE RESULTS: NO GROWTH — SIGNIFICANT CHANGE UP
CULTURE RESULTS: NO GROWTH — SIGNIFICANT CHANGE UP
FIBRINOGEN PPP-MCNC: 371 MG/DL — SIGNIFICANT CHANGE UP (ref 200–435)
FIBRINOGEN PPP-MCNC: 371 MG/DL — SIGNIFICANT CHANGE UP (ref 200–435)
HCT VFR BLD CALC: 21.2 % — LOW (ref 34.5–45)
HCT VFR BLD CALC: 21.2 % — LOW (ref 34.5–45)
HCT VFR BLD CALC: 27.8 % — LOW (ref 34.5–45)
HCT VFR BLD CALC: 27.8 % — LOW (ref 34.5–45)
HGB BLD-MCNC: 7.5 G/DL — LOW (ref 11.5–15.5)
HGB BLD-MCNC: 7.5 G/DL — LOW (ref 11.5–15.5)
HGB BLD-MCNC: 9.6 G/DL — LOW (ref 11.5–15.5)
HGB BLD-MCNC: 9.6 G/DL — LOW (ref 11.5–15.5)
INR BLD: 1.08 RATIO — SIGNIFICANT CHANGE UP (ref 0.85–1.18)
INR BLD: 1.08 RATIO — SIGNIFICANT CHANGE UP (ref 0.85–1.18)
MCHC RBC-ENTMCNC: 30.3 PG — SIGNIFICANT CHANGE UP (ref 27–34)
MCHC RBC-ENTMCNC: 30.3 PG — SIGNIFICANT CHANGE UP (ref 27–34)
MCHC RBC-ENTMCNC: 31 PG — SIGNIFICANT CHANGE UP (ref 27–34)
MCHC RBC-ENTMCNC: 31 PG — SIGNIFICANT CHANGE UP (ref 27–34)
MCHC RBC-ENTMCNC: 34.5 GM/DL — SIGNIFICANT CHANGE UP (ref 32–36)
MCHC RBC-ENTMCNC: 34.5 GM/DL — SIGNIFICANT CHANGE UP (ref 32–36)
MCHC RBC-ENTMCNC: 35.4 GM/DL — SIGNIFICANT CHANGE UP (ref 32–36)
MCHC RBC-ENTMCNC: 35.4 GM/DL — SIGNIFICANT CHANGE UP (ref 32–36)
MCV RBC AUTO: 87.6 FL — SIGNIFICANT CHANGE UP (ref 80–100)
MCV RBC AUTO: 87.6 FL — SIGNIFICANT CHANGE UP (ref 80–100)
MCV RBC AUTO: 87.7 FL — SIGNIFICANT CHANGE UP (ref 80–100)
MCV RBC AUTO: 87.7 FL — SIGNIFICANT CHANGE UP (ref 80–100)
PLATELET # BLD AUTO: 264 K/UL — SIGNIFICANT CHANGE UP (ref 150–400)
PLATELET # BLD AUTO: 264 K/UL — SIGNIFICANT CHANGE UP (ref 150–400)
PLATELET # BLD AUTO: 282 K/UL — SIGNIFICANT CHANGE UP (ref 150–400)
PLATELET # BLD AUTO: 282 K/UL — SIGNIFICANT CHANGE UP (ref 150–400)
PROTHROM AB SERPL-ACNC: 12.2 SEC — SIGNIFICANT CHANGE UP (ref 9.5–13)
PROTHROM AB SERPL-ACNC: 12.2 SEC — SIGNIFICANT CHANGE UP (ref 9.5–13)
RBC # BLD: 2.42 M/UL — LOW (ref 3.8–5.2)
RBC # BLD: 2.42 M/UL — LOW (ref 3.8–5.2)
RBC # BLD: 3.17 M/UL — LOW (ref 3.8–5.2)
RBC # BLD: 3.17 M/UL — LOW (ref 3.8–5.2)
RBC # FLD: 13.8 % — SIGNIFICANT CHANGE UP (ref 10.3–14.5)
SPECIMEN SOURCE: SIGNIFICANT CHANGE UP
SPECIMEN SOURCE: SIGNIFICANT CHANGE UP
WBC # BLD: 12.6 K/UL — HIGH (ref 3.8–10.5)
WBC # BLD: 12.6 K/UL — HIGH (ref 3.8–10.5)
WBC # BLD: 13.82 K/UL — HIGH (ref 3.8–10.5)
WBC # BLD: 13.82 K/UL — HIGH (ref 3.8–10.5)
WBC # FLD AUTO: 12.6 K/UL — HIGH (ref 3.8–10.5)
WBC # FLD AUTO: 12.6 K/UL — HIGH (ref 3.8–10.5)
WBC # FLD AUTO: 13.82 K/UL — HIGH (ref 3.8–10.5)
WBC # FLD AUTO: 13.82 K/UL — HIGH (ref 3.8–10.5)

## 2023-12-16 RX ADMIN — SIMETHICONE 80 MILLIGRAM(S): 80 TABLET, CHEWABLE ORAL at 10:53

## 2023-12-16 NOTE — CHART NOTE - NSCHARTNOTEFT_GEN_A_CORE
GONZÁLEZ IVEY is a 28y now POD#1 s/p laparoscopic right salpingectomy and evacuation of hemoperitoneum due to ruptured ectopic pregnancy.    Patient evaluated at bedside, She is now s/p 2U RBCs. Post-transfusion RBC reveals Hgb of 7.5. Patient reports feeling tired. Denies dizziness, lightheadedness, chest pain, SOB, headache. She is ambulating without difficulty. Passing flatus. Tolerating PO diet.     ICU Vital Signs Last 24 Hrs  T(C): 36.9 (16 Dec 2023 00:00), Max: 37.7 (15 Dec 2023 12:15)  T(F): 98.4 (16 Dec 2023 00:00), Max: 99.9 (15 Dec 2023 12:15)  HR: 87 (16 Dec 2023 00:00) (78 - 100)  BP: 113/56 (16 Dec 2023 00:00) (92/54 - 124/59)  RR: 16 (16 Dec 2023 00:00) (16 - 18)  SpO2: 96% (16 Dec 2023 00:00) (93% - 100%)    O2 Parameters below as of 16 Dec 2023 00:00  Patient On (Oxygen Delivery Method): room air    On physical exam:  Gen: NAD, resting comfortably.  Abdomen, soft, nondistended, appropriately tender.  Incisions, All abdominal incisions clear, dry, and intact with dermabond in place.                          7.5    13.82 )-----------( 264      ( 16 Dec 2023 01:52 )             21.2       A/P:  -VSS, normotensive, HR wnl.  -Physical exam reassuring.  -Patient agreeable to thrid unit of RBCs.  -Will order stat coags.  -Repeat post transfusion CBC and coags.    Discussed with Dr. Sanon GONZÁLEZ IVEY is a 28y now POD#1 s/p laparoscopic right salpingectomy and evacuation of hemoperitoneum due to ruptured ectopic pregnancy.    Patient evaluated at bedside, She is now s/p 2U RBCs intra-op and 1U RBCs yesterday. Post-transfusion RBC reveals Hgb of 7.5. Patient reports feeling tired. Denies dizziness, lightheadedness, chest pain, SOB, headache. She is ambulating without difficulty. Passing flatus. Tolerating PO diet. Pain is well tolerated.    ICU Vital Signs Last 24 Hrs  T(C): 36.9 (16 Dec 2023 00:00), Max: 37.7 (15 Dec 2023 12:15)  T(F): 98.4 (16 Dec 2023 00:00), Max: 99.9 (15 Dec 2023 12:15)  HR: 87 (16 Dec 2023 00:00) (78 - 100)  BP: 113/56 (16 Dec 2023 00:00) (92/54 - 124/59)  RR: 16 (16 Dec 2023 00:00) (16 - 18)  SpO2: 96% (16 Dec 2023 00:00) (93% - 100%)    O2 Parameters below as of 16 Dec 2023 00:00  Patient On (Oxygen Delivery Method): room air    On physical exam:  Gen: NAD, resting comfortably.  Abdomen, soft, nondistended, appropriately tender.  Incisions, All abdominal incisions clear, dry, and intact with dermabond in place.                          7.5    13.82 )-----------( 264      ( 16 Dec 2023 01:52 )             21.2       A/P:  -VSS, normotensive, HR wnl.  -Physical exam reassuring.  -Patient agreeable to thrid unit of RBCs.  -Will order stat coags.  -Repeat post transfusion CBC and coags.    Discussed with Dr. Sanon GONZÁLEZ IVEY is a 28y now POD#1 s/p laparoscopic right salpingectomy and evacuation of hemoperitoneum due to ruptured ectopic pregnancy.    Patient evaluated at bedside, She is now s/p 2U RBCs intra-op and 1U RBCs yesterday. Post-transfusion RBC reveals Hgb of 7.5. Patient reports feeling tired. Denies dizziness, lightheadedness, chest pain, SOB, headache. She is ambulating without difficulty. Passing flatus. Tolerating PO diet. Pain is well tolerated.    ICU Vital Signs Last 24 Hrs  T(C): 36.9 (16 Dec 2023 00:00), Max: 37.7 (15 Dec 2023 12:15)  T(F): 98.4 (16 Dec 2023 00:00), Max: 99.9 (15 Dec 2023 12:15)  HR: 87 (16 Dec 2023 00:00) (78 - 100)  BP: 113/56 (16 Dec 2023 00:00) (92/54 - 124/59)  RR: 16 (16 Dec 2023 00:00) (16 - 18)  SpO2: 96% (16 Dec 2023 00:00) (93% - 100%)    O2 Parameters below as of 16 Dec 2023 00:00  Patient On (Oxygen Delivery Method): room air    On physical exam:  Gen: NAD, resting comfortably.  Abdomen, soft, nondistended, appropriately tender.  Incisions, All abdominal incisions clear, dry, and intact with dermabond in place.                          7.5    13.82 )-----------( 264      ( 16 Dec 2023 01:52 )             21.2       A/P:  -VSS, normotensive, HR wnl.  -Physical exam reassuring.  -Patient agreeable to fourth unit of RBCs.  -Will order stat coags.  -Repeat post transfusion CBC and coags.    Discussed with Dr. Sanon GONZÁLEZ IVEY is a 28y now POD#1 s/p laparoscopic right salpingectomy and evacuation of hemoperitoneum due to ruptured ectopic pregnancy.    Patient evaluated at bedside, She is now s/p 2U RBCs intra-op and 1U RBCs yesterday. Post-transfusion RBC reveals Hgb of 7.5. Patient reports feeling tired. Denies dizziness, lightheadedness, chest pain, SOB, headache. She is ambulating without difficulty. Passing flatus. Tolerating PO diet. Pain is well tolerated.    ICU Vital Signs Last 24 Hrs  T(C): 36.9 (16 Dec 2023 00:00), Max: 37.7 (15 Dec 2023 12:15)  T(F): 98.4 (16 Dec 2023 00:00), Max: 99.9 (15 Dec 2023 12:15)  HR: 87 (16 Dec 2023 00:00) (78 - 100)  BP: 113/56 (16 Dec 2023 00:00) (92/54 - 124/59)  RR: 16 (16 Dec 2023 00:00) (16 - 18)  SpO2: 96% (16 Dec 2023 00:00) (93% - 100%)    O2 Parameters below as of 16 Dec 2023 00:00  Patient On (Oxygen Delivery Method): room air    On physical exam:  Gen: NAD, resting comfortably.  Abdomen, soft, nondistended, appropriately tender.  Incisions, All abdominal incisions clear, dry, and intact with dermabond in place.                          7.5    13.82 )-----------( 264      ( 16 Dec 2023 01:52 )             21.2       A/P:  -VSS, normotensive, HR wnl.  -Physical exam reassuring.  -Patient agreeable to fourth unit of RBCs.  -Will order stat coags.  -Repeat post transfusion CBC and coags.    Discussed with Dr. Sanon    addendum:   MD1 note, agree with above, patient reports fatigue, but otherwise is asymptomatic, ambulating and voiding with no issues, abdomen soft, non-distended, will proceed with one more unit for now and repeat CBC/coags 4 hrs post transfusion

## 2023-12-16 NOTE — DISCHARGE NOTE NURSING/CASE MANAGEMENT/SOCIAL WORK - NSDCPEFALRISK_GEN_ALL_CORE
For information on Fall & Injury Prevention, visit: [email or web address] OR  [email or web address] OR  https://www.cdc.gov/steadi/patient.html For information on Fall & Injury Prevention, visit: [email or web address] OR  [email or web address] OR  https://www.cdc.gov/steadi/patient.html

## 2023-12-16 NOTE — PROGRESS NOTE ADULT - ATTENDING COMMENTS
MD 1 Note  Pt seen and examined. Agree with note.  Pt comfortable. No other complaints.   Ambulating, juancarlos po.   No lightheadedness, no dizziness.  Pain well controlled.   Inc c / d / I  PT is stable for dc  will check am cbc first.  Follow up disc.  PLEE

## 2023-12-16 NOTE — PROGRESS NOTE ADULT - SUBJECTIVE AND OBJECTIVE BOX
GONZÁLEZ IVEY is a 28y now POD#0 s/p laparoscopic right salpingectomy and evacuation of hemoperitoneum due to ruptured ectopic pregnancy.    S:    No acute events overnight.   Patient was seen and examined at bedside.   Patient has no complaints this AM.   Pain is well controlled with current treatment regimen.   Tolerating sips of water diet, denies N/V.   Patient has not ambulated since surgery. Will try to ambulate this morning.  She denies lightheadedness, dizziness, palpitations, chest pain and SOB.     O:   T(C): 36.7 (12-15-23 @ 04:20), Max: 36.8 (12-14-23 @ 20:25)  HR: 78 (12-15-23 @ 04:20) (78 - 120)  BP: 110/71 (12-15-23 @ 04:20) (76/47 - 118/79)  RR: 18 (12-15-23 @ 04:20) (16 - 25)  SpO2: 99% (12-15-23 @ 04:20) (95% - 100%)    Gen: NAD, AAOx3  CV: RRR, S1/S2 present  Pulm: CTAB  Abdomen: Soft, nondistended, appropriately tender, + BS   Pelvic: Pad inspected with minimal amount of dark red blood   Incision: Three abdominal incisions clean, dry, and intact  Extremities: No calf tenderness or edema     Labs:       12-14-23 @ 07:01  -  12-15-23 @ 05:57  --------------------------------------------------------  IN: 3604 mL / OUT: 3100 mL / NET: 504 mL            
GONZÁLEZ IVEY is a 28y now POD#1 HD3 s/p laparoscopic right salpingectomy and evacuation of hemoperitoneum due to ruptured ectopic pregnancy. 2u pRBC intraop, 1u post op.    S:    No acute events overnight.   Patient was seen and examined at bedside.   Patient has no complaints this AM.   Pain is well controlled with current treatment regimen.   Tolerating diet, denies N/V.   Ambulating without difficulty.  She denies lightheadedness, dizziness, palpitations, chest pain and SOB.     O:   ICU Vital Signs Last 24 Hrs  T(C): 36.7 (16 Dec 2023 06:17), Max: 37.7 (15 Dec 2023 12:15)  T(F): 98.1 (16 Dec 2023 06:17), Max: 99.9 (15 Dec 2023 12:15)  HR: 89 (16 Dec 2023 06:17) (82 - 100)  BP: 104/64 (16 Dec 2023 06:17) (92/54 - 124/59)  RR: 16 (16 Dec 2023 06:17) (16 - 18)  SpO2: 97% (16 Dec 2023 06:17) (93% - 100%)    Gen: NAD, AAOx3  Abdomen: Soft, nondistended, appropriately tender  Pelvic: No active vaginal bleeding   Incision: port site clean, dry, and intact  Extremities: No calf tenderness or edema     Labs:     Complete Blood Count (12.16.23 @ 01:52)    WBC Count: 13.82 K/uL   RBC Count: 2.42 M/uL   Hemoglobin: 7.5 g/dL   Hematocrit: 21.2 %   Mean Cell Volume: 87.6 fl   Mean Cell Hemoglobin: 31.0 pg   Mean Cell Hemoglobin Conc: 35.4 gm/dL   Red Cell Distrib Width: 13.8 %   Platelet Count - Automated: 264 K/uL

## 2023-12-16 NOTE — DISCHARGE NOTE NURSING/CASE MANAGEMENT/SOCIAL WORK - PATIENT PORTAL LINK FT
You can access the FollowMyHealth Patient Portal offered by [facility] by registering at the following website: [email or web address]. By joining [employer]’s FollowMyHealth portal, you will also be able to view your health information using other applications (apps) compatible with our system. You can access the FollowMyHealth Patient Portal offered by [facility] by registering at the following website: [email or web address]. By joining [email or web address] FollowMyHealth portal, you will also be able to view your health information using other applications (apps) compatible with our system.

## 2023-12-16 NOTE — PROGRESS NOTE ADULT - ASSESSMENT
GONZÁLEZ IVEY is a 28y now POD#1 HD3 s/p laparoscopic right salpingectomy and evacuation of hemoperitoneum due to ruptured ectopic pregnancy. 2u pRBC intraop, 1u post op.    A/P:   Neuro: Pain well controlled. Continue current pain regimen.  CV: No history of cardiovascular disease. Blood pressure well controlled.  Pulm: No active disease. Saturating well on room air. Incentive spirometer use encouraged  GI: No active disease. Bowel sounds and function normal, tolerating regular diet. Continue current bowel regimen.   : Arenas in place, can remove this morning.  Heme: Hgb 9.7 -> 8.6 > 7.5 > 1u prbcs > AM CBC pending   ID: Afebrile. No antibiotics indicated at this time.   DVT ppx: Ambulation encouraged, SCDs when in bed  Dispo: Continue to monitor. Possible discharge today pending improved CBC. 
GONZÁLEZ IVEY is a 28y now POD#0 s/p laparoscopic right salpingectomy and evacuation of hemoperitoneum due to ruptured ectopic pregnancy.    A/P:   Neuro: Pain well controlled. Continue current pain regimen.  CV: No history of cardiovascular disease. Blood pressure well controlled.  Pulm: No active disease. Saturating well on room air. Incentive spirometer use encouraged  GI: No active disease. Bowel sounds and function normal, tolerating sips of water. Continue current bowel regimen.   : Arenas in place, can remove this morning.  Heme: Hgb 9.7 -> 8.6  ID: Afebrile. No antibiotics indicated at this time.   FEN: IVF at 77. Will discontinue IVF when tolerating PO. Electrolytes WNL. AM labs pending.   DVT ppx: Ambulation encouraged, SCDs when in bed  Dispo: Continue to monitor. Possible discharge this afternoon.

## 2023-12-19 LAB
SURGICAL PATHOLOGY STUDY: SIGNIFICANT CHANGE UP
SURGICAL PATHOLOGY STUDY: SIGNIFICANT CHANGE UP

## 2023-12-20 DIAGNOSIS — O00.101 RIGHT TUBAL PREGNANCY WITHOUT INTRAUTERINE PREGNANCY: ICD-10-CM

## 2023-12-20 DIAGNOSIS — R00.0 TACHYCARDIA, UNSPECIFIED: ICD-10-CM

## 2023-12-20 DIAGNOSIS — D62 ACUTE POSTHEMORRHAGIC ANEMIA: ICD-10-CM

## 2023-12-20 DIAGNOSIS — K66.1 HEMOPERITONEUM: ICD-10-CM

## 2023-12-20 DIAGNOSIS — O99.891 OTHER SPECIFIED DISEASES AND CONDITIONS COMPLICATING PREGNANCY: ICD-10-CM

## 2023-12-20 DIAGNOSIS — O99.411 DISEASES OF THE CIRCULATORY SYSTEM COMPLICATING PREGNANCY, FIRST TRIMESTER: ICD-10-CM

## 2023-12-20 DIAGNOSIS — I95.9 HYPOTENSION, UNSPECIFIED: ICD-10-CM

## 2023-12-20 DIAGNOSIS — O08.1 DELAYED OR EXCESSIVE HEMORRHAGE FOLLOWING ECTOPIC AND MOLAR PREGNANCY: ICD-10-CM

## 2023-12-20 DIAGNOSIS — Z3A.10 10 WEEKS GESTATION OF PREGNANCY: ICD-10-CM

## 2023-12-20 DIAGNOSIS — O99.011 ANEMIA COMPLICATING PREGNANCY, FIRST TRIMESTER: ICD-10-CM

## 2023-12-20 LAB
CULTURE RESULTS: SIGNIFICANT CHANGE UP
SPECIMEN SOURCE: SIGNIFICANT CHANGE UP

## 2024-02-14 NOTE — PATIENT PROFILE ADULT - IS THERE A SUSPICION OF ABUSE/NEGLIGENCE?
no
I will START or STAY ON the medications listed below when I get home from the hospital:    ibuprofen 600 mg oral tablet  -- 1 tab(s) by mouth every 6 hours  -- Indication: For Pain    acetaminophen 325 mg oral tablet  -- 3 tab(s) by mouth every 6 hours  -- Indication: For Pain    NIFEdipine 60 mg oral tablet, extended release  -- 1 tab(s) by mouth once a day  -- Indication: For Hypertension    senna leaf extract oral tablet  -- 2 tab(s) by mouth once a day (at bedtime)  -- Indication: For constipation    simethicone 80 mg oral tablet, chewable  -- 1 tab(s) by mouth every 4 hours As needed Gas  -- Indication: For Gas

## 2024-08-01 NOTE — ED PROVIDER NOTE - CPE EDP [facility] NORM
29F with acute appendicitis   WBC 13     PLAN   - Admit to surgery   - Add on for laparoscopic appendectomy today   - NPO  - IVF   - Abx   - Pain control PRN     Discussed with attending on call Dr. Kenny  
normal...